# Patient Record
(demographics unavailable — no encounter records)

---

## 2024-11-25 NOTE — PHYSICAL EXAM
[General Appearance - Well Developed] : well developed [Bowel Sounds] : normal bowel sounds [Urethral Meatus] : meatus normal

## 2024-11-26 NOTE — HISTORY OF PRESENT ILLNESS
[FreeTextEntry1] : Had right renal urothelial ca upper tract in 2022 had robot assisted nephroureterectomy with [facility] Dr. Law high grade pt3 (though, confusingly elsewhere in notes referred to as Low grade UTUC)  initially was recommended to have adjuvant immunotherapy but pt deferred   in beginning of this year had bladder tumor and turbt in july for multifocal low grade UC and then got intravesical chemotherapy with gemcitabine and docetaxel x 6    CT of abdomen about a month ago in October  last cystoscopy >3 months ago